# Patient Record
Sex: FEMALE | URBAN - METROPOLITAN AREA
[De-identification: names, ages, dates, MRNs, and addresses within clinical notes are randomized per-mention and may not be internally consistent; named-entity substitution may affect disease eponyms.]

---
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
## 2025-01-07 ENCOUNTER — OFFICE VISIT (OUTPATIENT)
Dept: ORTHOPEDIC SURGERY | Age: 20
End: 2025-01-07

## 2025-01-07 VITALS — WEIGHT: 125 LBS | HEIGHT: 63 IN | BODY MASS INDEX: 22.15 KG/M2 | RESPIRATION RATE: 14 BRPM

## 2025-01-07 DIAGNOSIS — S43.431A BANKART LESION OF RIGHT SHOULDER, INITIAL ENCOUNTER: Primary | ICD-10-CM

## 2025-01-07 DIAGNOSIS — S43.80XA REVERSE BANKART LESION OF SHOULDER: ICD-10-CM

## 2025-01-07 DIAGNOSIS — S43.431A TYPE 2 SUPERIOR LABRUM EXTENDING FROM ANTERIOR TO POSTERIOR (SLAP) LESION OF RIGHT SHOULDER, INITIAL ENCOUNTER: ICD-10-CM

## 2025-01-07 DIAGNOSIS — S42.291A HILL SACHS DEFORMITY, RIGHT: ICD-10-CM

## 2025-01-07 PROCEDURE — 99024 POSTOP FOLLOW-UP VISIT: CPT | Performed by: ORTHOPAEDIC SURGERY

## 2025-01-07 NOTE — PROGRESS NOTES
[facility] PHYS[facility] ORTHOPEDICS AND SPORTS MEDICINE  [address] [facility] [address]     Surgery:  12/20/2024  Right Shoulder Arthroscopy, Anterior Superior And Posterior Labral Repairs With Remplissage Of Abbott Sachs Defect, Right Bankart Repair   ( Addt'l Cpt Codes 29806 X 2 And 50586) - Right and Remplissage Of Rotator Cuff Tendon Into Hill-sachs Defect - Right    History of Present Illness:    01/07/25  This is a 19 y.o. female who presents to the clinic today for post op follow up for Right Shoulder Arthroscopy, Anterior Superior And Posterior Labral Repairs With Remplissage Of Abbott Sachs Defect, Right Bankart Repair   ( Addt'l Cpt Codes 29806 X 2 And 55472) - Right and Remplissage Of Rotator Cuff Tendon Into Hill-sachs Defect - Right on 12/20/2024 .  Doing great, no pain.  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  Until then, no reaching behind her head or back until the 6-week elliot.  Follow-up as needed      Physical Exam:  Pain is well-controlled.  she is doing well postoperatively.  The operative extremity has a well-healed incision.  It is clean, dry, and intact.    Swelling is well-controlled.    We discussed the expected postoperative course, including the relevant weightbearing restrictions/immobilization.     1. Bankart lesion of right shoulder, initial encounter    2. Hill Sachs deformity, right    3. Type 2 superior labrum extending from anterior to posterior (SLAP) lesion of right shoulder, initial encounter    4. Reverse Bankart lesion of shoulder        Electronically signed by Boubacar Cox MD on 1/7/2025 at 1:51 PM

## (undated) DEVICE — DRESSING,GAUZE,XEROFORM,CURAD,1"X8",ST: Brand: CURAD

## (undated) DEVICE — Q-FIX REUSABLE 2.8MM PATHFINDER OBTURATOR: Brand: Q-FIX

## (undated) DEVICE — SOLUTION IV 1000ML 0.9% SOD CHL PH 5 INJ USP VIAFLX PLAS

## (undated) DEVICE — TUBING PMP L16FT MAIN DISP FOR AR-6400 AR-6475 Â€“ ORDER MULTIPLES OF 10 EACH

## (undated) DEVICE — TUBE IRRIG L8IN LNG PT W/ CONN FOR PMP SYS REDEUCE

## (undated) DEVICE — HYPODERMIC SAFETY NEEDLE: Brand: MAGELLAN

## (undated) DEVICE — SHEET, ORTHO, SPLIT, STERILE: Brand: MEDLINE

## (undated) DEVICE — TOWEL,OR,DSP,ST,BLUE,DLX,XR,4/PK,20PK/CS: Brand: MEDLINE

## (undated) DEVICE — STOCKINETTE,IMPERVIOUS,12X48,STERILE: Brand: MEDLINE

## (undated) DEVICE — CANN 5.5 WO/HOLES LTX FREE ORANGE

## (undated) DEVICE — BANDAGE COBAN 4 IN COMPR W4INXL5YD FOAM COHESIVE QUIK STK SELF ADH SFT

## (undated) DEVICE — Device

## (undated) DEVICE — SLING ARM SM W75XL138IN POLY COT DP L PKT DBL D RNG HK

## (undated) DEVICE — GLOVE SURG SZ 75 CRM LTX FREE POLYISOPRENE POLYMER BEAD ANTI

## (undated) DEVICE — PASSER SUT DIA1.8MM 25DEG R TIGHT CRV STIFF SHFT SHRP

## (undated) DEVICE — GLOVE SURG SZ 8 L12IN FNGR THK79MIL GRN LTX FREE

## (undated) DEVICE — AMBIENT SUPER TURBOVAC 90: Brand: COBLATION

## (undated) DEVICE — PAD COOL W10.9XL11.3IN UNIV REG HOSE WRP ON THER CLD

## (undated) DEVICE — DISPOSABLE KIT FOR 1.8 MM Q-FIX                                    IMPLANT, INCLUDES DRILL, DRILL GUIDE                                    AND OBTURATOR: Brand: Q-FIX

## (undated) DEVICE — DRAPE,SHOULDER,ORTHOMAX,W/POUCH,5/CS: Brand: MEDLINE

## (undated) DEVICE — PASSER SUT DIA1.8MM 25DEG L TIGHT CRV STIFF SHFT SHRP

## (undated) DEVICE — 4-PORT MANIFOLD: Brand: NEPTUNE 2

## (undated) DEVICE — PASSER SUT DIA1.8MM NIT CRESC STIFF SHFT SHRP ATRAUM TIP

## (undated) DEVICE — GOWN,SIRUS,NONRNF,SETINSLV,XL,20/CS: Brand: MEDLINE

## (undated) DEVICE — SOLUTION IRRIG 3000ML 0.9% SOD CHL USP UROMATIC PLAS CONT